# Patient Record
Sex: FEMALE | Race: BLACK OR AFRICAN AMERICAN | NOT HISPANIC OR LATINO | URBAN - METROPOLITAN AREA
[De-identification: names, ages, dates, MRNs, and addresses within clinical notes are randomized per-mention and may not be internally consistent; named-entity substitution may affect disease eponyms.]

---

## 2024-03-25 ENCOUNTER — EMERGENCY (EMERGENCY)
Facility: HOSPITAL | Age: 69
LOS: 1 days | Discharge: HOME | End: 2024-03-25
Admitting: EMERGENCY MEDICINE
Payer: MEDICARE

## 2024-03-25 VITALS
SYSTOLIC BLOOD PRESSURE: 154 MMHG | DIASTOLIC BLOOD PRESSURE: 70 MMHG | HEART RATE: 95 BPM | OXYGEN SATURATION: 99 % | RESPIRATION RATE: 18 BRPM | TEMPERATURE: 98 F

## 2024-03-25 VITALS
OXYGEN SATURATION: 100 % | RESPIRATION RATE: 18 BRPM | DIASTOLIC BLOOD PRESSURE: 85 MMHG | HEART RATE: 69 BPM | TEMPERATURE: 98 F | SYSTOLIC BLOOD PRESSURE: 137 MMHG

## 2024-03-25 PROCEDURE — 73562 X-RAY EXAM OF KNEE 3: CPT | Mod: 26,RT

## 2024-03-25 PROCEDURE — 73502 X-RAY EXAM HIP UNI 2-3 VIEWS: CPT | Mod: 26,RT

## 2024-03-25 PROCEDURE — 99284 EMERGENCY DEPT VISIT MOD MDM: CPT

## 2024-03-25 PROCEDURE — 93971 EXTREMITY STUDY: CPT | Mod: 26,LT

## 2024-03-25 RX ORDER — LIDOCAINE 4 G/100G
1 CREAM TOPICAL ONCE
Refills: 0 | Status: COMPLETED | OUTPATIENT
Start: 2024-03-25 | End: 2024-03-25

## 2024-03-25 RX ORDER — ACETAMINOPHEN 500 MG
975 TABLET ORAL ONCE
Refills: 0 | Status: COMPLETED | OUTPATIENT
Start: 2024-03-25 | End: 2024-03-25

## 2024-03-25 RX ADMIN — LIDOCAINE 1 PATCH: 4 CREAM TOPICAL at 13:33

## 2024-03-25 RX ADMIN — Medication 975 MILLIGRAM(S): at 13:32

## 2024-03-25 NOTE — ED ADULT NURSE NOTE - CHIEF COMPLAINT QUOTE
Pt with right lower back pain radiates down leg x one month. pt states no relief with tylenol. fs 73 apple juice given in triage.

## 2024-03-25 NOTE — ED PROVIDER NOTE - OBJECTIVE STATEMENT
69 y/o female with pmhx of RA, HTN, DM on ozempic, presents to ED c/o right hip pain radiating down right leg x 1 month. Pt states she follows with an orthopedist for her right knee replacement and has appointment to be evaluated 4/4. Scheduled to have left knee replacement as well. Has not had recent imaging MRI or xrays. Pt went to an emergency room who performed a urinalysis and was told her kidneys are okay. However pt denies urinary symptoms and flank pain and came to ER for second opinion. Was told it could be her rheumatoid arthritis she has had for years, however pt does not have a rheumatologist. States she also follows her with cardiologist regularly. Pt states pain is worse with movement. Would like a knee xray and to make sure she doesn't have a blood clot. Denies new injury or trauma. No recent travel or hx of dvt/pe. Denies fever, chest pain, sob, palpitations, abd pain, dysuria, weakness, numbness, tingling.

## 2024-03-25 NOTE — ED PROVIDER NOTE - NSFOLLOWUPINSTRUCTIONS_ED_ALL_ED_FT
Follow with your PMD within 48-72 hours.  Rest, no heavy lifting.  Warm compresses to area. Recommend Ortho consult to discuss possible MRI vs Physical Therapy- referral list provided.  Light walking.     Take Alleve as neded for pain.  Take Tylenol 650mg (Two 325 mg pills) every 4-6 hours as needed for pain.   You may also use Salonpas pain patches over the counter as needed for pain.       Any worsening pain, weakness, numbness, bowel or urinary incontinence or new concerning symptoms return to the Emergency Department.

## 2024-03-25 NOTE — ED PROVIDER NOTE - NSDCPRINTRESULTS_ED_ALL_ED
Patient requests all Lab, Cardiology, and Radiology Results on their Discharge Instructions verbal cues/1 person assist

## 2024-03-25 NOTE — ED PROVIDER NOTE - PATIENT PORTAL LINK FT
You can access the FollowMyHealth Patient Portal offered by [facility] by registering at the following website: [email or web address]. By joining [facility]’s FollowMyHealth portal, you will also be able to view your health information using other applications (apps) compatible with our system.

## 2024-03-25 NOTE — ED ADULT NURSE NOTE - OBJECTIVE STATEMENT
Patient is a 67 yo female, phx DM2, HTN, presenting with atraumatic R lower back pain radiating down R leg x 2 weeks. A&Ox4, no signs of distress, able to walk with pain. Denies fever, urinary symptoms. No swelling to leg. Medicated for pain per orders. Pending x-ray/US. Meal provided. Fall precautions maintained.

## 2024-03-25 NOTE — ED PROVIDER NOTE - CLINICAL SUMMARY MEDICAL DECISION MAKING FREE TEXT BOX
69 y/o female with pmhx of RA, HTN, DM on ozempic, presents to ED c/o right hip pain radiating down right leg x 1 month. Has ortho apt on  4/4. Came to ER for second opinion. Was told it could be her rheumatoid arthritis she has had for years, however pt does not have a rheumatologist. Pt states pain is worse with movement. Would like a knee xray and to make sure she doesn't have a blood clot. no dvt risk factors. likely sciatica given piriforms muscle ttp, no midline spinal ttp. no neuro deficits, pt ambulatory with cane. plan to check hip and knee XR, US r/o dvt, provide pain control, tylenol and lidocaine patch. will have pt f/u with ortho outpatient for possible MRI, PT. pt amenable w/ plan.